# Patient Record
Sex: FEMALE
[De-identification: names, ages, dates, MRNs, and addresses within clinical notes are randomized per-mention and may not be internally consistent; named-entity substitution may affect disease eponyms.]

---

## 2018-04-09 ENCOUNTER — HOSPITAL ENCOUNTER (EMERGENCY)
Dept: HOSPITAL 97 - ER | Age: 43
Discharge: HOME | End: 2018-04-09
Payer: COMMERCIAL

## 2018-04-09 DIAGNOSIS — K52.9: Primary | ICD-10-CM

## 2018-04-09 PROCEDURE — 99283 EMERGENCY DEPT VISIT LOW MDM: CPT

## 2018-04-09 PROCEDURE — 81003 URINALYSIS AUTO W/O SCOPE: CPT

## 2018-04-09 PROCEDURE — 74018 RADEX ABDOMEN 1 VIEW: CPT

## 2018-04-09 PROCEDURE — 81025 URINE PREGNANCY TEST: CPT

## 2018-04-09 NOTE — EDPHYS
Physician Documentation                                                                           

 [facility]                                                                

Name: Rowan Kumar                                                                                

Age: 42 yrs                                                                                       

Sex: Female                                                                                       

: 1975                                                                                   

MRN: [id number]                                                                                   

Arrival Date: 2018                                                                          

Time: 06:21                                                                                       

Account#: [id number]                                                                            

Bed 7                                                                                             

Private MD:                                                                                       

ED Physician Jorge A Avila                                                                     

HPI:                                                                                              

                                                                                             

08:11 This 42 yrs old  Female presents to ER via Ambulatory with complaints of        jr8 

      Abdominal Pain.                                                                             

08:11 The patient presents with abdominal pain in the upper abdomen. Onset: The               jr8 

      symptoms/episode began/occurred acutely, 2 day(s) ago. The symptoms do not radiate.         

      Associated signs and symptoms: Pertinent positives: nausea, vomiting, and diarrhea. The     

      symptoms are described as crampy. Modifying factors: The symptoms are alleviated by         

      nothing, the symptoms are aggravated by food. Severity of pain: At its worst the pain       

      was mild in the emergency department the pain is unchanged. It is unknown whether or        

      not the patient has had similar symptoms in the past. The patient has not recently seen     

      a physician. Patient stated that she had n/v/d over the past two days with mild             

      abdominal cramping. N/V/D has subsided. Had eaten a good bit yesterday. Started to have     

      abdominal cramping again and wanted to be evaluated .                                       

                                                                                                  

OB/GYN:                                                                                           

06:35 LMP 3/20/2018                                                                           bp  

                                                                                                  

Historical:                                                                                       

- Allergies:                                                                                      

06:34 No Known Allergies;                                                                     bp  

- Home Meds:                                                                                      

06:34 None [Active];                                                                          bp  

- PMHx:                                                                                           

06:34 None;                                                                                   bp  

- PSHx:                                                                                           

06:34 Cholecystectomy;                                                                        bp  

                                                                                                  

- Immunization history:: Adult Immunizations up to date.                                          

- Social history:: Smoking status: Patient/guardian denies using tobacco.                         

                                                                                                  

                                                                                                  

ROS:                                                                                              

08:11 Eyes: Negative for injury, pain, redness, and discharge, ENT: Negative for injury,      jr8 

      pain, and discharge, Neck: Negative for injury, pain, and swelling, Cardiovascular:         

      Negative for chest pain, palpitations, and edema, Respiratory: Negative for shortness       

      of breath, cough, wheezing, and pleuritic chest pain, Back: Negative for injury and         

      pain, MS/Extremity: Negative for injury and deformity, Skin: Negative for injury, rash,     

      and discoloration, Neuro: Negative for headache, weakness, numbness, tingling, and          

      seizure.                                                                                    

08:11 Abdomen/GI: Positive for nausea, vomiting, and diarrhea, abdominal cramps, Negative for     

      abdominal distension, anorexia, dysphagia, hematemesis, black/tarry stool, rectal pain,     

      rectal bleeding, bowel incontinence, flatulence.                                            

                                                                                                  

Exam:                                                                                             

08:11 Eyes:  Pupils equal round and reactive to light, extra-ocular motions intact.  Lids and jr8 

      lashes normal.  Conjunctiva and sclera are non-icteric and not injected.  Cornea within     

      normal limits.  Periorbital areas with no swelling, redness, or edema. ENT:  Nares          

      patent. No nasal discharge, no septal abnormalities noted.  Tympanic membranes are          

      normal and external auditory canals are clear.  Oropharynx with no redness, swelling,       

      or masses, exudates, or evidence of obstruction, uvula midline.  Mucous membranes           

      moist. Neck:  Trachea midline, no thyromegaly or masses palpated, and no cervical           

      lymphadenopathy.  Supple, full range of motion without nuchal rigidity, or vertebral        

      point tenderness.  No Meningismus. Cardiovascular:  Regular rate and rhythm with a          

      normal S1 and S2.  No gallops, murmurs, or rubs.  Normal PMI, no JVD.  No pulse             

      deficits. Respiratory:  Lungs have equal breath sounds bilaterally, clear to                

      auscultation and percussion.  No rales, rhonchi or wheezes noted.  No increased work of     

      breathing, no retractions or nasal flaring. Back:  No spinal tenderness.  No                

      costovertebral tenderness.  Full range of motion. Skin:  Warm, dry with normal turgor.      

      Normal color with no rashes, no lesions, and no evidence of cellulitis. MS/ Extremity:      

      Pulses equal, no cyanosis.  Neurovascular intact.  Full, normal range of motion. Neuro:     

       Awake and alert, GCS 15, oriented to person, place, time, and situation.  Cranial          

      nerves II-XII grossly intact.  Motor strength 5/5 in all extremities.  Sensory grossly      

      intact.  Cerebellar exam normal.  Normal gait.                                              

08:11 Abdomen/GI: Inspection: abdomen appears normal, Bowel sounds: active, all quadrants,        

      Palpation: abdomen is soft and non-tender, in all quadrants, Indicators: McBurney's         

      point is not tender, Noble's sign is negative, Rovsing's sign is negative, Liver: no       

      appreciated palpable abnormalities, tenderness, is not appreciated.                         

                                                                                                  

Vital Signs:                                                                                      

06:35  / 74; Pulse 100; Resp 18; Temp 98.2; Pulse Ox 100% ; Weight 90.72 kg; Height 5   bp  

      ft. 1 in. (154.94 cm); Pain 8/10;                                                           

07:03  / 83; Pulse 95; Resp 18; Pulse Ox 100% on R/A;                                   hj  

08:41  / 78; Pulse 90; Resp 18; Pulse Ox 100% on R/A;                                   hj  

06:35 Body Mass Index 37.79 (90.72 kg, 154.94 cm)                                             bp  

                                                                                                  

MDM:                                                                                              

06:58 Patient medically screened.                                                             jr8 

08:26 Data reviewed: vital signs, nurses notes, lab test result(s), radiologic studies, plain jr8 

      films, and as a result, I will discharge patient. Data interpreted: Pulse oximetry: on      

      room air is 100 %. Interpretation: normal. Counseling: I had a detailed discussion with     

      the patient and/or guardian regarding: the historical points, exam findings, and any        

      diagnostic results supporting the discharge/admit diagnosis, lab results, radiology         

      results, the need for outpatient follow up, a family practitioner, to return to the         

      emergency department if symptoms worsen or persist or if there are any questions or         

      concerns that arise at home. Response to treatment: the patient's symptoms have             

      markedly improved after treatment. Special discussion: Based on the patient's Hx, exam,     

      and Dx evaluation, there is no indication for emergent surgery or inpatient Tx. It is       

      understood by the patient/guardian that if the Sx's persist or worsen they need to          

      return immediately for re-evaluation.                                                       

                                                                                                  

                                                                                             

06:40 Order name: Urine Dipstick--Ancillary (enter results); Complete Time: 07:58             em1 

                                                                                             

06:40 Order name: Urine Pregnancy--Ancillary (enter results); Complete Time: 07:58            em1 

                                                                                             

07:45 Order name: SARAH PIERRE                                                                    jr8 

                                                                                                  

Administered Medications:                                                                         

07:11 Drug: Bentyl 20 mg Route: PO;                                                           hj  

07:15 Follow up: Response: No adverse reaction; Pain is decreased                             hj  

07:11 Drug: Zofran 4 mg Route: PO;                                                            hj  

07:16 Follow up: Response: No adverse reaction; Nausea is decreased                           hj  

                                                                                                  

                                                                                                  

Disposition:                                                                                      

04/10                                                                                             

00:20 Co-signature as Attending Physician, Jorge A Avila MD I agree with the assessment and 4 

      plan of care.                                                                               

                                                                                                  

Disposition:                                                                                      

18 08:27 Discharged to Home. Impression: Abdominal and pelvic pain, Gastroenteritis.        

- Condition is Stable.                                                                            

- Discharge Instructions: Abdominal Pain, Adult, Viral Gastroenteritis.                           

- Prescriptions for Bentyl 20 mg Oral Tablet - take 1 tablet by ORAL route every 6                

  hours As needed; 20 tablet. ondansetron 4 mg Oral tablet,disintegrating - take 1                

  tablet by ORAL route every 8 hours; 20 tablet.                                                  

- Medication Reconciliation Form, Thank You Letter, Antibiotic Education, Prescription            

  Opioid Use form.                                                                                

- Follow up: Private Physician; When: 5 - 6 days; Reason: Recheck today's complaints,             

  Continuance of care, Re-evaluation by your physician.                                           

- Problem is new.                                                                                 

- Symptoms have improved.                                                                         

                                                                                                  

                                                                                                  

                                                                                                  

Signatures:                                                                                       

Dispatcher [employer]                           EDMS                                                 

Clay Zhang PA PA   jr8                                                  

Shant Anderson RN RN hj Peltier, Brian, RN                      RN   Jorge A Robert MD MD   [id number]                                                  

                                                                                                  

**************************************************************************************************

## 2018-04-09 NOTE — ER
Nurse's Notes                                                                                     

 [facility]                                                                

Name: Rowan Kumar                                                                                

Age: 42 yrs                                                                                       

Sex: Female                                                                                       

: 1975                                                                                   

MRN: [id number]                                                                                   

Arrival Date: 2018                                                                          

Time: 06:21                                                                                       

Account#: [id number]                                                                            

Bed 7                                                                                             

Private MD:                                                                                       

Diagnosis: Abdominal and pelvic pain;Gastroenteritis                                              

                                                                                                  

Presentation:                                                                                     

                                                                                             

06:33 Presenting complaint: Patient states: I THOUGHT I HAD A STOMACH BUG BUT LAST NIGHT IT   bp  

      STARTED HURTING TOO BAD. Transition of care: patient was not received from another          

      setting of care. Onset of symptoms was 2018 at 21:00. Care prior to arrival:      

      None.                                                                                       

06:33 Method Of Arrival: Ambulatory                                                           bp  

06:33 Acuity: NILAY 3                                                                           bp  

                                                                                                  

Triage Assessment:                                                                                

06:34 General: Appears in no apparent distress. comfortable, obese, Behavior is calm,         bp  

      cooperative, appropriate for age. Pain: Complains of pain in right upper quadrant and       

      left upper quadrant Pain currently is 8 out of 10 on a pain scale. EENT: No deficits        

      noted. Neuro: Level of Consciousness is awake, alert, obeys commands, Oriented to           

      Appropriate for age. Cardiovascular: No deficits noted. Respiratory: Airway is patent       

      Respiratory effort is even, unlabored, Respiratory pattern is regular, symmetrical. GI:     

      Abdomen is obese, Abd is soft X 4 quads. Derm: No deficits noted. Musculoskeletal:          

      Capillary refill.                                                                           

                                                                                                  

OB/GYN:                                                                                           

06:35 LMP 3/20/2018                                                                           bp  

                                                                                                  

Historical:                                                                                       

- Allergies:                                                                                      

06:34 No Known Allergies;                                                                     bp  

- Home Meds:                                                                                      

06:34 None [Active];                                                                          bp  

- PMHx:                                                                                           

06:34 None;                                                                                   bp  

- PSHx:                                                                                           

06:34 Cholecystectomy;                                                                        bp  

                                                                                                  

- Immunization history:: Adult Immunizations up to date.                                          

- Social history:: Smoking status: Patient/guardian denies using tobacco.                         

                                                                                                  

                                                                                                  

Screenin:01 Abuse screen: Denies threats or abuse. Denies injuries from another. Nutritional        hj  

      screening: No deficits noted. Tuberculosis screening: No symptoms or risk factors           

      identified. Fall Risk None identified.                                                      

                                                                                                  

Assessment:                                                                                       

07:01 GI: Bowel sounds present X 4 quads.                                                     hj  

07:02 General: Appears in no apparent distress. uncomfortable, Behavior is calm, cooperative, hj  

      appropriate for age. Pain: Complains of pain in right upper quadrant and left upper         

      quadrant. Neuro: Level of Consciousness is awake, alert, obeys commands, Oriented to        

      person, place, time, situation, Appropriate for age. Cardiovascular: Capillary refill <     

      3 seconds Patient's skin is warm and dry. Respiratory: Airway is patent Respiratory         

      effort is even, unlabored, Respiratory pattern is regular, symmetrical. GI: Reports         

      upper abdominal pain, nausea. GI: Abdomen is non-distended, Abd is soft Abd is non          

      tender. : No signs and/or symptoms were reported regarding the genitourinary system.      

      EENT: No signs and/or symptoms were reported regarding the EENT system. Derm: No signs      

      and/or symptoms reported regarding the dermatologic system. Musculoskeletal: No signs       

      and/or symptoms reported regarding the musculoskeletal system.                              

08:42 Reassessment: Patient and/or family updated on plan of care and expected duration. Pain hj  

      level reassessed. Patient is alert, oriented x 3, equal unlabored respirations, skin        

      warm/dry/pink.                                                                              

                                                                                                  

Vital Signs:                                                                                      

06:35  / 74; Pulse 100; Resp 18; Temp 98.2; Pulse Ox 100% ; Weight 90.72 kg; Height 5   bp  

      ft. 1 in. (154.94 cm); Pain 8/10;                                                           

07:03  / 83; Pulse 95; Resp 18; Pulse Ox 100% on R/A;                                   hj  

08:41  / 78; Pulse 90; Resp 18; Pulse Ox 100% on R/A;                                   hj  

06:35 Body Mass Index 37.79 (90.72 kg, 154.94 cm)                                             bp  

                                                                                                  

ED Course:                                                                                        

06:21 Patient arrived in ED.                                                                  ds1 

06:29 Ronaldo Iraheta, RN is Primary Nurse.                                                    bp  

06:34 Triage completed.                                                                       bp  

06:35 Arm band placed on.                                                                     bp  

06:58 Clay Zhang PA is PHCP.                                                               [id number] 

06:58 Jorge A Avila MD is Attending Physician.                                            jr8 

07:01 Patient has correct armband on for positive identification. Placed in gown. Bed in low  hj  

      position. Call light in reach. Side rails up X 1. Adult w/ patient.                         

08:01 Patient moved to radiology via wheelchair.                                              jb2 

08:04 XRAY KUB In Process Unspecified.                                                        EDMS

08:18 X-ray completed. Patient tolerated procedure well. Patient moved back from radiology.   jb2 

08:40 No provider procedures requiring assistance completed. Patient did not have IV access   hj  

      during this emergency room visit.                                                           

                                                                                                  

Administered Medications:                                                                         

07:11 Drug: Bentyl 20 mg Route: PO;                                                             

07:15 Follow up: Response: No adverse reaction; Pain is decreased                               

07:11 Drug: Zofran 4 mg Route: PO;                                                              

07:16 Follow up: Response: No adverse reaction; Nausea is decreased                             

                                                                                                  

                                                                                                  

Outcome:                                                                                          

08:27 Discharge ordered by MD. mathews 

08:41 Discharged to home ambulatory, with family.                                               

08:41 Condition: stable                                                                           

08:41 Discharge instructions given to patient, family, Instructed on discharge instructions,      

      follow up and referral plans. medication usage, Demonstrated understanding of               

      instructions, follow-up care, medications, Prescriptions given X 2.                         

08:42 Patient left the ED.                                                                      

                                                                                                  

Signatures:                                                                                       

Dispatcher [employer]                           EDMS                                                 

Apollo Wilson                              jb2                                                  

Phyllis Gutierrez Josh, PA PA   jr8                                                  

Shant Anderson, RN                      RN   Ronaldo Clifton, RN                      RN   bp                                                   

                                                                                                  

Corrections: (The following items were deleted from the chart)                                    

08:11 08:03 X-ray completed. Patient tolerated procedure well. nicko                            jb2 

08:11 08:03 Patient moved back from radiology. jb2                                            jb2 

                                                                                                  

**************************************************************************************************

## 2018-04-09 NOTE — RAD REPORT
EXAM DESCRIPTION:  RAD - Abdomen 1 View (KUB) - 4/9/2018 8:14 am

 

CLINICAL HISTORY:  Abdomen pain.

 

FINDINGS:  The bowel gas pattern is unremarkable.

 

Small calcifications in the pelvis are nonspecific but probably represent phleboliths. Surgical clips
 are present within the right upper quadrant

## 2018-06-14 ENCOUNTER — HOSPITAL ENCOUNTER (EMERGENCY)
Dept: HOSPITAL 97 - ER | Age: 43
Discharge: HOME | End: 2018-06-14
Payer: COMMERCIAL

## 2018-06-14 DIAGNOSIS — R53.1: Primary | ICD-10-CM

## 2018-06-14 DIAGNOSIS — E03.9: ICD-10-CM

## 2018-06-14 DIAGNOSIS — E28.2: ICD-10-CM

## 2018-06-14 LAB
BUN BLD-MCNC: 10 MG/DL (ref 6–20)
GLUCOSE SERPLBLD-MCNC: 96 MG/DL (ref 65–120)
HCT VFR BLD CALC: 42 % (ref 36–45)
LYMPHOCYTES # SPEC AUTO: 1.8 K/UL (ref 0.7–4.9)
MCH RBC QN AUTO: 30.2 PG (ref 27–35)
MCV RBC: 87.5 FL (ref 80–100)
PMV BLD: 10.1 FL (ref 7.6–11.3)
POTASSIUM SERPL-SCNC: 3.7 MEQ/L (ref 3.6–5)
RBC # BLD: 4.8 M/UL (ref 3.86–4.86)

## 2018-06-14 PROCEDURE — 36415 COLL VENOUS BLD VENIPUNCTURE: CPT

## 2018-06-14 PROCEDURE — 85025 COMPLETE CBC W/AUTO DIFF WBC: CPT

## 2018-06-14 PROCEDURE — 99283 EMERGENCY DEPT VISIT LOW MDM: CPT

## 2018-06-14 PROCEDURE — 81003 URINALYSIS AUTO W/O SCOPE: CPT

## 2018-06-14 PROCEDURE — 80048 BASIC METABOLIC PNL TOTAL CA: CPT

## 2018-06-14 PROCEDURE — 81025 URINE PREGNANCY TEST: CPT

## 2018-06-14 NOTE — ER
Nurse's Notes                                                                                     

 [facility]                                                                

Name: Rowan Kumar                                                                                

Age: 43 yrs                                                                                       

Sex: Female                                                                                       

: 1975                                                                                   

MRN: [id number]                                                                                   

Arrival Date: 2018                                                                          

Time: 12:56                                                                                       

Account#: [id number]                                                                            

Bed 16                                                                                            

Private MD: Shivani Plummer H                                                                    

Diagnosis: Weakness                                                                               

                                                                                                  

Presentation:                                                                                     

                                                                                             

13:36 Presenting complaint: Patient states: "I've been feeling fatigued. 4 years ago I had my aj1 

      potassium low and I felt like this" Reports fatigue and dizziness. Transition of care:      

      patient was not received from another setting of care. No acute neurological deficit is     

      noted. Onset of symptoms was Gisella 10, 2018. Risk Assessment: Do you want to hurt            

      yourself or someone else? Patient reports no desire to harm self or others. Initial         

      Sepsis Screen: Does the patient meet any 2 criteria? No. Patient's initial sepsis           

      screen is negative. Does the patient have a suspected source of infection? No.              

      Patient's initial sepsis screen is negative. Care prior to arrival: None.                   

13:36 Method Of Arrival: Ambulatory                                                           [facility] 

13:36 Acuity: NILAY 3                                                                           aj 

                                                                                                  

Triage Assessment:                                                                                

13:42 The onset of the patients symptoms was more than six hours ago. The onset of the        aj1 

      patients symptoms was Gisella 10, 2018 at 08:00. General: Appears in no apparent distress.     

      comfortable, Behavior is calm, cooperative, appropriate for age. Pain: Denies pain.         

      Neuro: Level of Consciousness is awake, alert, obeys commands, Oriented to person,          

      place, time, situation,  are equal bilaterally Moves all extremities. Full             

      function Gait is steady, Speech is normal, Facial symmetry appears normal, Reports          

      dizziness, weakness. Cardiovascular: Patient's skin is warm and dry. Respiratory:           

      Airway is patent Respiratory effort is even, unlabored, Respiratory pattern is regular,     

      symmetrical. Derm: Skin is pink, warm \T\ dry. normal. Musculoskeletal: No signs and/or     

      symptoms reported regarding the musculoskeletal system. Circulation, motion, and            

      sensation intact.                                                                           

                                                                                                  

OB/GYN:                                                                                           

13:42 LMP 2018                                                                            [facility] 

                                                                                                  

Stroke Activation: Symptom onset > 6 hours                                                        

 Physician: Stroke Attending; Name: ; Notified At: ; Arrived At:                                  

 Physician: Chief Stroke Resident; Name: ; Notified At: ; Arrived At:                             

 Physician: Stroke Resident; Name: ; Notified At: ; Arrived At:                                   

 Physician: ED Attending; Name: ; Notified At: ; Arrived At:                                      

 Physician: ED Resident; Name: ; Notified At: ; Arrived At:                                       

                                                                                                  

Historical:                                                                                       

- Allergies:                                                                                      

13:42 No Known Allergies;                                                                     aj1 

- Home Meds:                                                                                      

13:42 levothyroxine 50 mcg tab 1 tab once daily [Active];                                     aj1 

- PMHx:                                                                                           

13:42 PCOS; Hypothyroidism;                                                                   aj1 

- PSHx:                                                                                           

13:42 Cholecystectomy;                                                                        aj1 

                                                                                                  

- Immunization history:: Flu vaccine is not up to date.                                           

- Social history:: Smoking status: Patient/guardian denies using tobacco.                         

- Ebola Screening: : Patient denies travel to an Ebola-affected area in the 21 days               

  before illness onset.                                                                           

- Family history:: not pertinent.                                                                 

- Hospitalizations: : No recent hospitalization is reported.                                      

                                                                                                  

                                                                                                  

Screenin:50 Abuse screen: Denies threats or abuse. Nutritional screening: No deficits noted.        aa5 

      Tuberculosis screening: No symptoms or risk factors identified. Fall Risk None              

      identified.                                                                                 

                                                                                                  

Assessment:                                                                                       

14:50 General: Appears comfortable, Behavior is calm, cooperative. Pain: Denies pain. Neuro:  aa5 

      Level of Consciousness is awake, alert, obeys commands, Oriented to person, place,          

      time, situation,  are equal bilaterally Moves all extremities. Gait is steady,         

      Speech is normal, Facial symmetry appears normal, Pupils are PERRLA, Reports weakness       

      Pt also reports intermittent dizziness, denies dizziness currently, pt states "I take       

      meclizine for the dizziness" . Cardiovascular: Heart tones S1 S2 present Rhythm is          

      regular. Respiratory: Airway is patent Respiratory effort is even, unlabored,               

      Respiratory pattern is regular, symmetrical, Breath sounds are clear bilaterally. GI:       

      Abdomen is round non-distended, Bowel sounds present X 4 quads. Abd is soft and non         

      tender X 4 quads. : No signs and/or symptoms were reported regarding the                  

      genitourinary system. EENT: No signs and/or symptoms were reported regarding the EENT       

      system. Derm: Skin is pink, warm \T\ dry. Musculoskeletal: Range of motion: intact in all   

      extremities.                                                                                

16:10 Reassessment: Patient is alert, oriented x 3, equal unlabored respirations, skin        aa5 

      warm/dry/pink. Patient denies pain at this time.                                            

                                                                                                  

Vital Signs:                                                                                      

13:42  / 88; Pulse 76; Resp 18; Temp 98.3(O); Pulse Ox 100% on R/A; Weight 91.17 kg     aj1 

      (R); Height 5 ft. 1 in. (154.94 cm) (R); Pain 0/10;                                         

15:30  / 82; Pulse 66; Resp 16 S; Pulse Ox 100% on R/A; Pain 0/10;                      aa5 

13:42 Body Mass Index 37.98 (91.17 kg, 154.94 cm)                                             aj1 

                                                                                                  

ED Course:                                                                                        

12:56 Patient arrived in ED.                                                                  mr  

12:56 Shivani Plummer DO is Private Physician.                                               mr  

13:41 Triage completed.                                                                       aj1 

13:42 Arm band placed on Patient placed in waiting room, Patient notified of wait time.       aj1 

14:23 Evelyn Johnston, RN is Primary Nurse.                                                   aa5 

14:29 Sav Hector MD is Attending Physician.                                                rn  

14:50 Patient has correct armband on for positive identification. Placed in gown. Bed in low  aa5 

      position. Call light in reach. Side rails up X2.                                            

15:00 Urine collected: clean catch specimen, clear.                                           dh3 

15:00 Initial lab(s) drawn, by me, sent to lab. Inserted saline lock: 22 gauge in left        aa5 

      antecubital area, using aseptic technique. Blood collected.                                 

15:39 No provider procedures requiring assistance completed.                                  aa5 

16:10 IV discontinued, intact, bleeding controlled, No redness/swelling at site. Pressure     aa5 

      dressing applied.                                                                           

                                                                                                  

Administered Medications:                                                                         

No medications were administered                                                                  

                                                                                                  

                                                                                                  

Outcome:                                                                                          

15:40 Discharge ordered by MD.                                                                rn  

16:10 Discharged to home ambulatory.                                                          aa5 

16:10 Condition: stable                                                                           

16:10 Discharge instructions given to patient, Instructed on discharge instructions, follow       

      up and referral plans. Demonstrated understanding of instructions, follow-up care.          

16:15 Patient left the ED.                                                                    aa5 

                                                                                                  

Signatures:                                                                                       

Marisa Villatoro RN                     RN   aj1                                                  

Kathy Colvin                                mr                                                   

Sav Hector MD MD rn Calderon, Audri, RN                     RN   5                                                  

Annita Kim                              3                                                  

                                                                                                  

**************************************************************************************************

## 2018-06-14 NOTE — EDPHYS
Physician Documentation                                                                           

 [facility]                                                                

Name: Rowan Kumar                                                                                

Age: 43 yrs                                                                                       

Sex: Female                                                                                       

: 1975                                                                                   

MRN: [id number]                                                                                   

Arrival Date: 2018                                                                          

Time: 12:56                                                                                       

Account#: [id number]                                                                            

Bed 16                                                                                            

Private MD: Shivani Plummer H                                                                    

ED Physician Sav Hector                                                                         

HPI:                                                                                              

                                                                                             

14:47 This 43 yrs old  Female presents to ER via Ambulatory with complaints of        rn  

      Weakness.                                                                                   

14:47 The patient presents to the emergency department with weakness of the entire body,      rn  

      generalized weakness. Onset: The symptoms/episode began/occurred 4 day(s) ago.              

      Associated signs and symptoms: Pertinent positives: weakness, Pertinent negatives:          

      altered mental status, fever, seizure, syncope, blurred vision, double vision, visual       

      field changes, loss of vision. Severity of symptoms: At their worst the symptoms were       

      mild in the emergency department the symptoms are unchanged. Current symptoms:              

      generalized weakness. The patient has experienced a previous episode. REports               

      generalized weakness for a few days, no fever/vomiting/diarrhea/cough/sob/abd pain.         

      Similar episode in past and was low on potassium, improved with potassium..                 

                                                                                                  

OB/GYN:                                                                                           

13:42 LMP 2018                                                                            aj1 

                                                                                                  

Historical:                                                                                       

- Allergies:                                                                                      

13:42 No Known Allergies;                                                                     aj1 

- Home Meds:                                                                                      

13:42 levothyroxine 50 mcg tab 1 tab once daily [Active];                                     aj1 

- PMHx:                                                                                           

13:42 PCOS; Hypothyroidism;                                                                   aj1 

- PSHx:                                                                                           

13:42 Cholecystectomy;                                                                        aj1 

                                                                                                  

- Immunization history:: Flu vaccine is not up to date.                                           

- Social history:: Smoking status: Patient/guardian denies using tobacco.                         

- Ebola Screening: : Patient denies travel to an Ebola-affected area in the 21 days               

  before illness onset.                                                                           

- Family history:: not pertinent.                                                                 

- Hospitalizations: : No recent hospitalization is reported.                                      

                                                                                                  

                                                                                                  

ROS:                                                                                              

14:47 Constitutional: Negative for fever, chills, and weight loss, Eyes: Negative for injury, rn  

      pain, redness, and discharge, Neck: Negative for injury, pain, and swelling,                

      Cardiovascular: Negative for chest pain, palpitations, and edema, Respiratory: Negative     

      for shortness of breath, cough, wheezing, and pleuritic chest pain, Abdomen/GI:             

      Negative for abdominal pain, nausea, vomiting, diarrhea, and constipation, Back:            

      Negative for injury and pain, MS/Extremity: Negative for injury and deformity, Skin:        

      Negative for injury, rash, and discoloration, Neuro: Negative for headache, numbness,       

      tingling, and seizure.                                                                      

                                                                                                  

Exam:                                                                                             

14:47 Constitutional:  This is a well developed, well nourished patient who is awake, alert,  rn  

      and in no acute distress. Head/Face:  Normocephalic, atraumatic. Eyes:  Pupils equal        

      round and reactive to light, extra-ocular motions intact.  Lids and lashes normal.          

      Conjunctiva and sclera are non-icteric and not injected.  Cornea within normal limits.      

      Periorbital areas with no swelling, redness, or edema. Neck:  Trachea midline, no           

      thyromegaly or masses palpated, and no cervical lymphadenopathy.  Supple, full range of     

      motion without nuchal rigidity, or vertebral point tenderness.  No Meningismus.             

      Cardiovascular:  Regular rate and rhythm with a normal S1 and S2.  No gallops, murmurs,     

      or rubs.  Normal PMI, no JVD.  No pulse deficits. Respiratory:  Lungs have equal breath     

      sounds bilaterally, clear to auscultation and percussion.  No rales, rhonchi or wheezes     

      noted.  No increased work of breathing, no retractions or nasal flaring. Abdomen/GI:        

      Soft, non-tender, with normal bowel sounds.  No distension or tympany.  No guarding or      

      rebound.  No evidence of tenderness throughout. Skin:  Warm, dry with normal turgor.        

      Normal color with no rashes, no lesions, and no evidence of cellulitis. MS/ Extremity:      

      Pulses equal, no cyanosis.  Neurovascular intact.  Full, normal range of motion.  Equal     

      circumference. Neuro:  Awake and alert, GCS 15, oriented to person, place, time, and        

      situation.  Cranial nerves II-XII grossly intact.  Motor strength 5/5 in all                

      extremities.  Sensory grossly intact.  Cerebellar exam normal.  Normal gait.                

                                                                                                  

Vital Signs:                                                                                      

13:42  / 88; Pulse 76; Resp 18; Temp 98.3(O); Pulse Ox 100% on R/A; Weight 91.17 kg     aj1 

      (R); Height 5 ft. 1 in. (154.94 cm) (R); Pain 0/10;                                         

15:30  / 82; Pulse 66; Resp 16 S; Pulse Ox 100% on R/A; Pain 0/10;                      aa5 

13:42 Body Mass Index 37.98 (91.17 kg, 154.94 cm)                                             aj1 

                                                                                                  

MDM:                                                                                              

14:29 Patient medically screened.                                                             rn  

15:39 Data reviewed: vital signs, nurses notes, lab test result(s), and as a result, I will   rn  

      discharge patient. Counseling: I had a detailed discussion with the patient and/or          

      guardian regarding: the historical points, exam findings, and any diagnostic results        

      supporting the discharge/admit diagnosis, lab results, the need for outpatient follow       

      up, to return to the emergency department if symptoms worsen or persist or if there are     

      any questions or concerns that arise at home. Special discussion: I discussed with the      

      patient/guardian in detail that at this point there is no indication for admission to       

      the hospital. It is understood, however, that if the symptoms persist or worsen the         

      patient needs to return immediately for re-evaluation.                                      

                                                                                                  

                                                                                             

14:41 Order name: CBC with Diff; Complete Time: 15:39                                         rn  

                                                                                             

14:41 Order name: Basic Metabolic Panel; Complete Time: 15:39                                 rn  

                                                                                             

14:41 Order name: IV Start; Complete Time: 15:11                                              rn  

                                                                                             

14:41 Order name: Urine Dipstick-Ancillary (obtain specimen); Complete Time: 15:11            rn  

                                                                                             

15:08 Order name: Urine Dipstick--Ancillary (enter results)                                     

                                                                                             

15:08 Order name: Urine Pregnancy--Ancillary (enter results)                                  bd  

                                                                                             

14:41 Order name: Urine Pregnancy Test (obtain specimen); Complete Time: 15:11                rn  

                                                                                                  

Administered Medications:                                                                         

No medications were administered                                                                  

                                                                                                  

                                                                                                  

Disposition:                                                                                      

18 15:40 Discharged to Home. Impression: Weakness.                                          

- Condition is Stable.                                                                            

- Discharge Instructions: Weakness.                                                               

                                                                                                  

- Medication Reconciliation Form, Thank You Letter, Antibiotic Education, Prescription            

  Opioid Use form.                                                                                

- Follow up: Private Physician; When: As needed; Reason: Recheck today's complaints,              

  Re-evaluation by your physician.                                                                

- Problem is new.                                                                                 

- Symptoms have improved.                                                                         

                                                                                                  

                                                                                                  

                                                                                                  

Signatures:                                                                                       

Dispatcher [employer]                           Marisa Ortega RN                     RN   aj1                                                  

Sav Hector MD MD rn Calderon, Audri, RN                     RN   aa5                                                  

                                                                                                  

Corrections: (The following items were deleted from the chart)                                    

16:15 15:40 2018 15:40 Discharged to Home. Impression: Weakness. Condition is Stable.   aa5 

      Forms are Medication Reconciliation Form, Thank You Letter, Antibiotic Education,           

      Prescription Opioid Use. Follow up: Private Physician; When: As needed; Reason: Recheck     

      today's complaints, Re-evaluation by your physician. Problem is new. Symptoms have          

      improved. rn                                                                                

                                                                                                  

**************************************************************************************************

## 2019-12-13 ENCOUNTER — HOSPITAL ENCOUNTER (EMERGENCY)
Dept: HOSPITAL 97 - ER | Age: 44
Discharge: HOME | End: 2019-12-13
Payer: COMMERCIAL

## 2019-12-13 VITALS — OXYGEN SATURATION: 100 % | DIASTOLIC BLOOD PRESSURE: 86 MMHG | TEMPERATURE: 97.6 F | SYSTOLIC BLOOD PRESSURE: 149 MMHG
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
## 2019-12-13 DIAGNOSIS — L27.0: ICD-10-CM

## 2019-12-13 DIAGNOSIS — L23.89: Primary | ICD-10-CM

## 2019-12-13 DIAGNOSIS — T37.5X5A: ICD-10-CM

## 2019-12-13 PROCEDURE — 99283 EMERGENCY DEPT VISIT LOW MDM: CPT

## 2019-12-13 NOTE — EDPHYS
Physician Documentation                                                                           

 [facility]                                                                 

Name: Rowan Kumar                                                                                

Age: 44 yrs                                                                                       

Sex: Female                                                                                       

: 1975                                                                                   

MRN: [id number]                                                                                   

Arrival Date: 2019                                                                          

Time: 08:56                                                                                       

Account#: [id number]                                                                            

Bed 20                                                                                            

Private MD:                                                                                       

ED Physician Jimbo Reed                                                                      

HPI:                                                                                              

                                                                                             

09:23 This 44 yrs old  Female presents to ER via Ambulatory with complaints of        snw 

      Itching, Rash.                                                                              

09:23 The patient's rash thought to be caused by Dermatitis. The rash is located on the body  snw 

      diffusely. The rash can be described as patchy, pruritic. Onset: The symptoms/episode       

      began/occurred suddenly. Associated signs and symptoms: Pertinent positives: itching.       

      Severity of symptoms: At their worst the symptoms were moderate in the emergency            

      department the symptoms are unchanged. Treatment given at home: oral steroids. The          

      patient has not experienced similar symptoms in the past. pt was given Tamiflu for          

      prophylaxis, took two days and erupted with rash, tamiflu was stopped and po Medrol         

      dose pack begun. Pt began itching yesterday..                                               

                                                                                                  

OB/GYN:                                                                                           

15:33 LMP N/A -                                                                               iw  

                                                                                                  

Historical:                                                                                       

- Allergies:                                                                                      

09:07 No Known Allergies;                                                                     ss  

- Home Meds:                                                                                      

09:07 Medrol (Octavio) 4 mg oral DsPk [Active];                                                   ss  

- PMHx:                                                                                           

09:07 Hypothyroidism; PCOS;                                                                   ss  

- PSHx:                                                                                           

09:07 Cholecystectomy;                                                                        ss  

                                                                                                  

- Immunization history:: Adult Immunizations up to date.                                          

- Social history:: Smoking status: Patient/guardian denies using tobacco.                         

- Ebola Screening: : Patient denies exposure to infectious person Patient denies travel           

  to an Ebola-affected area in the 21 days before illness onset.                                  

                                                                                                  

                                                                                                  

ROS:                                                                                              

09:23 Constitutional: Negative for fever, chills, and weight loss, Eyes: Negative for injury, snw 

      pain, redness, and discharge, ENT: Negative for injury, pain, and discharge, Neck:          

      Negative for injury, pain, and swelling, Cardiovascular: Negative for chest pain,           

      palpitations, and edema, Respiratory: Negative for shortness of breath, cough,              

      wheezing, and pleuritic chest pain, Abdomen/GI: Negative for abdominal pain, nausea,        

      vomiting, diarrhea, and constipation, Back: Negative for injury and pain, : Negative      

      for injury, bleeding, discharge, and swelling, MS/Extremity: Negative for injury and        

      deformity, Neuro: Negative for headache, weakness, numbness, tingling, and seizure.         

09:23 Skin: Positive for rash, itching.                                                           

                                                                                                  

Exam:                                                                                             

09:22 Constitutional:  This is a well developed, well nourished patient who is awake, alert,  snw 

      and in no acute distress. Head/Face:  Normocephalic, atraumatic. Eyes:  Pupils equal        

      round and reactive to light, extra-ocular motions intact.  Lids and lashes normal.          

      Conjunctiva and sclera are non-icteric and not injected.  Cornea within normal limits.      

      Periorbital areas with no swelling, redness, or edema. ENT:  Nares patent. No nasal         

      discharge, no septal abnormalities noted.  Tympanic membranes are normal and external       

      auditory canals are clear.  Oropharynx with no redness, swelling, or masses, exudates,      

      or evidence of obstruction, uvula midline.  Mucous membranes moist. Neck:  Trachea          

      midline, no thyromegaly or masses palpated, and no cervical lymphadenopathy.  Supple,       

      full range of motion without nuchal rigidity, or vertebral point tenderness.  No            

      Meningismus. Chest/axilla:  Normal chest wall appearance and motion.  Nontender with no     

      deformity.  No lesions are appreciated. Cardiovascular:  Regular rate and rhythm with a     

      normal S1 and S2.  No gallops, murmurs, or rubs.  Normal PMI, no JVD.  No pulse             

      deficits. Respiratory:  Lungs have equal breath sounds bilaterally, clear to                

      auscultation and percussion.  No rales, rhonchi or wheezes noted.  No increased work of     

      breathing, no retractions or nasal flaring. Abdomen/GI:  Soft, non-tender, with normal      

      bowel sounds.  No distension or tympany.  No guarding or rebound.  No evidence of           

      tenderness throughout. Back:  No spinal tenderness.  No costovertebral tenderness.          

      Full range of motion. MS/ Extremity:  Pulses equal, no cyanosis.  Neurovascular intact.     

       Full, normal range of motion. Neuro:  Awake and alert, GCS 15, oriented to person,         

      place, time, and situation.  Cranial nerves II-XII grossly intact.  Motor strength 5/5      

      in all extremities.  Sensory grossly intact.  Cerebellar exam normal.  Normal gait.         

      Psych:  Awake, alert, with orientation to person, place and time.  Behavior, mood, and      

      affect are within normal limits.                                                            

09:22 Skin: Appearance: normal except for affected area, consistent with  drug rash.              

                                                                                                  

Vital Signs:                                                                                      

09:07  / 86; Pulse 84; Resp 16; Temp 97.6(TE); Pulse Ox 100% on R/A; Weight 93.44 kg;   ss  

      Height 5 ft. 1 in. (154.94 cm); Pain 0/10;                                                  

09:07 Body Mass Index 38.92 (93.44 kg, 154.94 cm)                                             ss  

                                                                                                  

MDM:                                                                                              

09:00 Patient medically screened.                                                             delfin 

                                                                                                  

Administered Medications:                                                                         

: Drug: Atarax 50 mg Route: PO;                                                           iw  

09:30 Follow up: Response: No adverse reaction                                                iw  

: Drug: predniSONE 20 mg Route: PO;                                                       iw  

:30 Follow up: Response: No adverse reaction                                                iw  

: Drug: Pepcid 20 mg Route: PO;                                                           iw  

 Follow up: Response: No adverse reaction                                                  

                                                                                                  

                                                                                                  

Disposition:                                                                                      

15:15 Co-signature as Attending Physician, Jimbo Reed MD I agree with the assessment and  [facility] 

      plan of care.                                                                               

                                                                                                  

Disposition:                                                                                      

19 09:11 Discharged to Home. Impression: Allergic contact dermatitis due to other agents    

  - Tamiflu.                                                                                      

- Condition is Stable.                                                                            

- Discharge Instructions: Contact Dermatitis, Rash.                                               

- Prescriptions for Vistaril 50 mg Oral capsule - take 1 capsule by ORAL route 3 times            

  per day; 30 capsule. Prednisone 20 mg Oral Tablet - take 2 tablet by ORAL route once            

  daily for 5 days; 10 tablet. Pepcid 20 mg Oral Tablet - take 1 tablet by ORAL route             

  once daily; 20 tablet.                                                                          

- Work release form, Medication Reconciliation Form, Thank You Letter, Antibiotic                 

  Education, Prescription Opioid Use form.                                                        

- Follow up: Private Physician; When: 2 - 3 days; Reason: Recheck today's complaints,             

  Continuance of care, Re-evaluation by your physician. Follow up: Emergency                      

  Department; When: As needed; Reason: Worsening of condition.                                    

                                                                                                  

                                                                                                  

                                                                                                  

Signatures:                                                                                       

Jimbo Reed MD MD cha Therrien, Shelly, FNP-C                 FNP-Csnw                                                  

Elena Stone RN                     RN                                                      

Priyanka Nguyen RN                      RN   ss                                                   

                                                                                                  

Corrections: (The following items were deleted from the chart)                                    

09:20 09:11 2019 09:11 Discharged to Home. Impression: Allergic contact dermatitis due  iw  

      to other agents - Tamiflu. Condition is Stable. Forms are Medication Reconciliation         

      Form, Thank You Letter, Antibiotic Education, Prescription Opioid Use. Follow up:           

      Private Physician; When: 2 - 3 days; Reason: Recheck today's complaints, Continuance of     

      care, Re-evaluation by your physician. Follow up: Emergency Department; When: As            

      needed; Reason: Worsening of condition. snw                                                 

                                                                                                  

**************************************************************************************************

## 2019-12-13 NOTE — ER
Nurse's Notes                                                                                     

 [facility]                                                                 

Name: Rowan Kumar                                                                                

Age: 44 yrs                                                                                       

Sex: Female                                                                                       

: 1975                                                                                   

MRN: [id number]                                                                                   

Arrival Date: 2019                                                                          

Time: 08:56                                                                                       

Account#: [id number]                                                                            

Bed 20                                                                                            

Private MD:                                                                                       

Diagnosis: Allergic contact dermatitis due to other agents-Tamiflu                                

                                                                                                  

Presentation:                                                                                     

                                                                                             

09:03 Presenting complaint: Patient states: Patient took Tamiflu last week for two days and   ss  

      developed a rash all over. Given a Medrol Dose pack Monday, but patient reports it is       

      not helping, but now it is itching since yesterday. Transition of care: patient was not     

      received from another setting of care. Onset: The symptoms/episode began/occurred 4         

      day(s) ago. Anaphylaxis evaluation, no signs or symptoms of anaphylaxis were noted.         

      Onset of symptoms was 2019. Risk Assessment: Do you want to hurt yourself      

      or someone else? Patient reports no desire to harm self or others. Initial Sepsis           

      Screen: Does the patient meet any 2 criteria? No. Patient's initial sepsis screen is        

      negative. Does the patient have a suspected source of infection? No. Patient's initial      

      sepsis screen is negative. Care prior to arrival: None.                                     

09:03 Method Of Arrival: Ambulatory                                                           ss  

09:03 Acuity: NILAY 5                                                                           ss  

                                                                                                  

OB/GYN:                                                                                           

15:33 LMP N/A -                                                                               iw  

                                                                                                  

Historical:                                                                                       

- Allergies:                                                                                      

09:07 No Known Allergies;                                                                     ss  

- Home Meds:                                                                                      

09:07 Medrol (Octavio) 4 mg oral DsPk [Active];                                                   ss  

- PMHx:                                                                                           

09:07 Hypothyroidism; PCOS;                                                                   ss  

- PSHx:                                                                                           

09:07 Cholecystectomy;                                                                        ss  

                                                                                                  

- Immunization history:: Adult Immunizations up to date.                                          

- Social history:: Smoking status: Patient/guardian denies using tobacco.                         

- Ebola Screening: : Patient denies exposure to infectious person Patient denies travel           

  to an Ebola-affected area in the 21 days before illness onset.                                  

                                                                                                  

                                                                                                  

Screenin:08 Abuse screen: Denies threats or abuse. Denies injuries from another. Nutritional        ss  

      screening: No deficits noted. Tuberculosis screening: Never had TB. Fall Risk None          

      identified.                                                                                 

                                                                                                  

Assessment:                                                                                       

09:08 General: Appears in no apparent distress. comfortable, Behavior is calm, cooperative,   ss  

      Denies fever, feeling ill, fatigue, chills. Pain: Denies pain. Neuro: Level of              

      Consciousness is awake, alert, obeys commands, Oriented to person, place, time,             

      situation. Cardiovascular: Capillary refill < 3 seconds is brisk in bilateral fingers.      

      Respiratory: Airway is patent Respiratory effort is even, unlabored, Respiratory            

      pattern is regular, Breath sounds are clear bilaterally. Denies cough, shortness of         

      breath labored breathing. GI: No signs and/or symptoms were reported involving the          

      gastrointestinal system. Derm: Rash noted that is macular, itchy, raised, Reports Rash      

      that began 4 days ago, call over body, after taking Tamiflu. Rash has not improved even     

      after starting Medrol Dose OCTAVIO. Itching began yesterday. Musculoskeletal: Circulation,      

      motion, and sensation intact. Range of motion: intact in all extremities.                   

                                                                                                  

Vital Signs:                                                                                      

09:07  / 86; Pulse 84; Resp 16; Temp 97.6(TE); Pulse Ox 100% on R/A; Weight 93.44 kg;   ss  

      Height 5 ft. 1 in. (154.94 cm); Pain 0/10;                                                  

09:07 Body Mass Index 38.92 (93.44 kg, 154.94 cm)                                               

                                                                                                  

ED Course:                                                                                        

08:56 Patient arrived in ED.                                                                  as  

08:56 Elena Stone, RN is Primary Nurse.                                                   iw  

09:00 Jimbo Reed MD is Attending Physician.                                             delfin 

09:05 Triage completed.                                                                       ss  

09:06 [facility]                                                        snw 

09:07 Arm band placed on right wrist.                                                         ss  

09:08 Patient has correct armband on for positive identification. Bed in low position. Call   ss  

      light in reach.                                                                             

09:19 No provider procedures requiring assistance completed. Patient did not have IV access   iw  

      during this emergency room visit.                                                           

                                                                                                  

Administered Medications:                                                                         

09:17 Drug: Atarax 50 mg Route: PO;                                                           iw  

09:30 Follow up: Response: No adverse reaction                                                iw  

09:17 Drug: predniSONE 20 mg Route: PO;                                                       iw  

09:30 Follow up: Response: No adverse reaction                                                iw  

09:17 Drug: Pepcid 20 mg Route: PO;                                                           iw  

09:30 Follow up: Response: No adverse reaction                                                iw  

                                                                                                  

                                                                                                  

Outcome:                                                                                          

09:11 Discharge ordered by MD.                                                                snw 

09:19 Discharged to home ambulatory.                                                          iw  

09:19 Condition: good                                                                             

09:19 Discharge instructions given to patient, Instructed on discharge instructions, follow       

      up and referral plans. Demonstrated understanding of instructions, follow-up care,          

      medications, Prescriptions given X 3.                                                       

09:20 Patient left the ED.                                                                    iw  

                                                                                                  

Signatures:                                                                                       

Jimbo Reed MD MD cha Therrien, Shelly, FNP-C                 FNP-Meenakshiw                                                  

Selam Jiménez Irene, RN                     RN   iw                                                   

Priyanka Nguyen RN                      RN   ss                                                   

                                                                                                  

**************************************************************************************************

## 2023-03-22 ENCOUNTER — HOSPITAL ENCOUNTER (EMERGENCY)
Dept: HOSPITAL 97 - ER | Age: 48
Discharge: HOME | End: 2023-03-22
Payer: COMMERCIAL

## 2023-03-22 VITALS — DIASTOLIC BLOOD PRESSURE: 73 MMHG | SYSTOLIC BLOOD PRESSURE: 156 MMHG

## 2023-03-22 VITALS — OXYGEN SATURATION: 100 % | TEMPERATURE: 97.4 F

## 2023-03-22 DIAGNOSIS — R42: Primary | ICD-10-CM

## 2023-03-22 DIAGNOSIS — Z88.8: ICD-10-CM

## 2023-03-22 LAB
ALBUMIN SERPL BCP-MCNC: 3.8 G/DL (ref 3.4–5)
ALP SERPL-CCNC: 58 U/L (ref 45–117)
ALT SERPL W P-5'-P-CCNC: 34 U/L (ref 13–56)
AST SERPL W P-5'-P-CCNC: 15 U/L (ref 15–37)
BUN BLD-MCNC: 9 MG/DL (ref 7–18)
GLUCOSE SERPLBLD-MCNC: 123 MG/DL (ref 74–106)
HCT VFR BLD CALC: 41.6 % (ref 36–45)
LYMPHOCYTES # SPEC AUTO: 1.5 K/UL (ref 0.7–4.9)
MCV RBC: 88.6 FL (ref 80–100)
PMV BLD: 9.2 FL (ref 7.6–11.3)
POTASSIUM SERPL-SCNC: 3.5 MEQ/L (ref 3.5–5.1)
RBC # BLD: 4.69 M/UL (ref 3.86–4.86)

## 2023-03-22 PROCEDURE — 36415 COLL VENOUS BLD VENIPUNCTURE: CPT

## 2023-03-22 PROCEDURE — 85025 COMPLETE CBC W/AUTO DIFF WBC: CPT

## 2023-03-22 PROCEDURE — 70450 CT HEAD/BRAIN W/O DYE: CPT

## 2023-03-22 PROCEDURE — 80053 COMPREHEN METABOLIC PANEL: CPT

## 2023-03-22 PROCEDURE — 81025 URINE PREGNANCY TEST: CPT

## 2023-03-22 NOTE — EDPHYS
Physician Documentation                                                                           

 [facility]                                                                 

Name: Rowan Kumar                                                                                

Age: 47 yrs                                                                                       

Sex: Female                                                                                       

: 1975                                                                                   

MRN: [id number]                                                                                   

Arrival Date: 2023                                                                          

Time: 11:26                                                                                       

Account#: [id number]                                                                            

Bed DIS4                                                                                          

Private MD: Shivani Plummer H                                                                    

ED Physician Alberto Lopez                                                                     

HPI:                                                                                              

                                                                                             

11:54 This 47 yrs old  Female presents to ER via Ambulatory with complaints of        snw 

      Dizziness.                                                                                  

11:54 The patient presents with lightheadedness, feeling off balance. Onset: The              snw 

      symptoms/episode began/occurred suddenly. Context: occurred at home, occurred while the     

      patient was at rest, just prior to the episode the patient experienced right hearing        

      decrease and watery feeling to right ear canal. Modifying factors: The symptoms are         

      alleviated by took Meclizine that usually helps but it did not relieve s/s today.           

      Associated signs and symptoms: Pertinent positives: hearing decreased in right ear,         

      sometimes pt has low potassium per report. Severity of symptoms: At their worst the         

      symptoms were moderate in the emergency department the symptoms have improved mildly.       

      Patient's baseline: Neuro: alert and fully oriented, Motor: no deficits, Ambulation:        

      walks without assistance, Speech: normal. The patient has experienced similar episodes      

      in the past, multiple times, but today's symptoms are worse. It is unknown whether or       

      not the patient has recently seen a physician.                                              

                                                                                                  

OB/GYN:                                                                                           

13:43 LMP N/A - Irregular menses                                                              ap3 

                                                                                                  

Historical:                                                                                       

- Allergies:                                                                                      

11:40 Tamiflu;                                                                                hb  

                                                                                                  

- Immunization history:: Client reports receiving the 2nd dose of the Covid vaccine.              

- Social history:: Smoking status: unknown.                                                       

                                                                                                  

                                                                                                  

ROS:                                                                                              

11:53 Eyes: Negative for injury, pain, redness, and discharge, ENT: Negative for injury,      snw 

      pain, and discharge, loss of hearing and watery feeling in right ear Neck: Negative for     

      injury, pain, and swelling, Cardiovascular: Negative for chest pain, palpitations, and      

      edema, Respiratory: Negative for shortness of breath, cough, wheezing, and pleuritic        

      chest pain, Abdomen/GI: Negative for abdominal pain, nausea, vomiting, diarrhea, and        

      constipation, Back: Negative for injury and pain, : Negative for injury, bleeding,        

      discharge, and swelling, MS/Extremity: Negative for injury and deformity, Skin:             

      Negative for injury, rash, and discoloration, Neuro: Negative for headache, weakness,       

      numbness, tingling, and seizure, positive for dizziness Psych: Negative for depression,     

      anxiety, suicide ideation, homicidal ideation, and hallucinations.                          

11:53 Constitutional: Positive for malaise, dizziness.                                            

                                                                                                  

Exam:                                                                                             

11:56 Head/Face:  Normocephalic, atraumatic. Eyes:  Pupils equal round and reactive to light, snw 

      extra-ocular motions intact.  Lids and lashes normal.  Conjunctiva and sclera are           

      non-icteric and not injected.  Cornea within normal limits.  Periorbital areas with no      

      swelling, redness, or edema.no nystagmus ENT:  Nares patent. No nasal discharge, no         

      septal abnormalities noted.  Tympanic membranes are normal on left but right canal full     

      of moist appearing cerumen/exudate and TM is not visualized.  Oropharynx with no            

      redness, swelling, or masses, exudates, or evidence of obstruction, uvula midline.          

      Mucous membranes moist. Neck:  Trachea midline, no thyromegaly or masses palpated, and      

      no cervical lymphadenopathy.  Supple, full range of motion without nuchal rigidity, or      

      vertebral point tenderness.  No Meningismus. Chest/axilla:  Normal chest wall               

      appearance and motion.  Nontender with no deformity.  No lesions are appreciated.           

      Cardiovascular:  Regular rate and rhythm with a normal S1 and S2.  No gallops, murmurs,     

      or rubs.  Normal PMI, no JVD.  No pulse deficits. Respiratory:  Lungs have equal breath     

      sounds bilaterally, clear to auscultation and percussion.  No rales, rhonchi or wheezes     

      noted.  No increased work of breathing, no retractions or nasal flaring. Abdomen/GI:        

      Soft, non-tender, with normal bowel sounds.  No distension or tympany.  No guarding or      

      rebound.  No evidence of tenderness throughout. Back:  No spinal tenderness.  No            

      costovertebral tenderness.  Full range of motion. Skin:  Warm, dry with normal turgor.      

      Normal color with no rashes, no lesions, and no evidence of cellulitis. MS/ Extremity:      

      Pulses equal, no cyanosis.  Neurovascular intact.  Full, normal range of motion. Neuro:     

       Awake and alert, GCS 15, oriented to person, place, time, and situation.  Cranial          

      nerves II-XII grossly intact.  Motor strength 5/5 in all extremities.  Sensory grossly      

      intact.  Cerebellar exam normal.  Normal gait. Psych:  Awake, alert, with orientation       

      to person, place and time.  Behavior, mood, and affect are within normal limits.            

11:56 Constitutional: The patient appears alert, awake, anxious.                                  

                                                                                                  

Vital Signs:                                                                                      

11:38  / 86; Pulse 97; Resp 16; Temp 97.4; Pulse Ox 100% on R/A; Weight 90.72 kg;       hb  

      Height 5 ft. 1 in. ; Pain 0/10;                                                             

13:33  / 69 Sitting; Pulse 100;                                                         ap3 

13:38  / 73 Standing; Pulse 103;                                                        ap3 

11:38 Body Mass Index 37.79 (90.72 kg, 154.94 cm)                                             hb  

11:38 Pain Scale: Adult                                                                       hb  

                                                                                                  

MDM:                                                                                              

11:46 Patient medically screened.                                                             snw 

11:59 Differential diagnosis: near-syncope, vertigo, electrolyte derangement.                 snw 

13:45 Data reviewed: vital signs, nurses notes, lab test result(s), radiologic studies, CT    snw 

      scan. Counseling: I had a detailed discussion with the patient and/or guardian              

      regarding: the historical points, exam findings, and any diagnostic results supporting      

      the discharge/admit diagnosis, the presence of at least one elevated blood pressure         

      reading (>120/80) during this emergency department visit, lab results, radiology            

      results, the need for outpatient follow up, for definitive care, to return to the           

      emergency department if symptoms worsen or persist or if there are any questions or         

      concerns that arise at home. Response to treatment: the patient's symptoms have mildly      

      improved after treatment, blood pressure trending down. Awaiting: IVF.                      

                                                                                                  

                                                                                             

11:52 Order name: CBC with Diff; Complete Time: 12:33                                         snw 

                                                                                             

11:52 Order name: CMP; Complete Time: 12:54                                                   snw 

                                                                                             

11:52 Order name: Pregnancy Test, Urine; Complete Time: 12:40                                 snw 

                                                                                             

11:52 Order name: CT Head Brain wo Cont; Complete Time: 12:33                                 snw 

                                                                                             

11:52 Order name: SL; Complete Time: 12:17                                                    snw 

                                                                                             

13:17 Order name: Orthostatic Blood Pressure; Complete Time: 13:41                            snw 

                                                                                                  

Administered Medications:                                                                         

13:51 Drug: NS 0.9%  ml Route: IV; Rate: bolus; Site: right forearm;                    ap3 

14:20 Follow up: IV Status: Completed infusion; IV Intake: 500ml                              ss  

                                                                                                  

                                                                                                  

Disposition:                                                                                      

17:23 Co-signature as Attending Physician, Alberto Lopez MD I reviewed the patient's care   rt  

      provided by the Advanced Practice Provider and agree with the diagnosis and treatment       

      plan.                                                                                       

                                                                                                  

Disposition Summary:                                                                              

23 13:44                                                                                    

Discharge Ordered                                                                                 

      Location: Home                                                                          snw 

      Condition: Stable                                                                       snw 

      Diagnosis                                                                                   

        - Dizziness and giddiness                                                             snw 

      Followup:                                                                               snw 

        - With: Shivani Plummer, DO                                                                

        - When: 2 - 3 days                                                                         

        - Reason: Recheck today's complaints, Continuance of care, Re-evaluation by your           

      physician                                                                                   

      Followup:                                                                               snw 

        - With: Emergency Department                                                               

        - When: As needed                                                                          

        - Reason: Worsening of condition                                                           

      Discharge Instructions:                                                                     

        - Discharge Summary Sheet                                                             snw 

        - Dizziness                                                                           snw 

        - Rehydration, Adult                                                                  snw 

      Forms:                                                                                      

        - Medication Reconciliation Form                                                      snw 

        - Thank You Letter                                                                    snw 

        - Antibiotic Education                                                                snw 

        - Prescription Opioid Use                                                             snw 

Signatures:                                                                                       

Dispatcher [employer]                           EDMS                                                 

Michelle Nichole FNP-C                   FNP-Csnw                                                  

Ana Miranda RN                     RN                                                      

Geovanna Harris RN                    RN   ap3                                                  

Alberto Lopez MD MD   rt                                                   

Priyanka Nguyen RN   ss                                                   

                                                                                                  

**************************************************************************************************

## 2023-03-22 NOTE — RAD REPORT
EXAM DESCRIPTION:  CT - Head Brain Wo Cont - 3/22/2023 12:22 pm

 

CLINICAL HISTORY:  Dizziness

 

COMPARISON:  none

 

TECHNIQUE:  Computed axial tomography of the head was obtained. IV contrast was not requested.

 

All CT scans are performed using dose optimization technique as appropriate and may include automated
 exposure control or mA/KV adjustment according to patient size.

 

FINDINGS:  An intracranial  bleed is not seen

 

The ventricles are normal in caliber

 

No significant hypodense areas within the brain visualized

 

No extra-axial fluid collection is noted.

 

Fluid within the sinuses/ mastoids is not seen

 

IMPRESSION:  No acute intracranial abnormality is seen

 

If patient's symptoms persist  MRI of the brain would be recommended

## 2024-04-05 NOTE — ER
Nurse's Notes                                                                                     

 [facility]                                                                 

Name: Rowan Kumar                                                                                

Age: 47 yrs                                                                                       

Sex: Female                                                                                       

: 1975                                                                                   

MRN: [id number]                                                                                   

Arrival Date: 2023                                                                          

Time: 11:26                                                                                       

Account#: [id number]                                                                            

Bed DIS4                                                                                          

Private MD: Shivani Plummer H                                                                    

Diagnosis: Dizziness and giddiness                                                                

                                                                                                  

Presentation:                                                                                     

                                                                                             

11:38 Chief complaint: Dizziness and decreased hearing in right ear since last night. Took    hb  

      meclizine 25 mg at 0900. Coronavirus screen: At this time, the client does not indicate     

      any symptoms associated with coronavirus-19. Ebola Screen: No symptoms or risks             

      identified at this time. Initial Sepsis Screen: Does the patient meet any 2 criteria?       

      No. Patient's initial sepsis screen is negative. Does the patient have a suspected          

      source of infection? No. Patient's initial sepsis screen is negative. Risk Assessment:      

      Do you want to hurt yourself or someone else? Patient reports no desire to harm self or     

      others. Onset of symptoms was 2023.                                               

11:38 Method Of Arrival: Ambulatory                                                             

11:38 Acuity: NILAY 3                                                                           hb  

                                                                                                  

Triage Assessment:                                                                                

13:42 General: Appears in no apparent distress. Behavior is calm, cooperative, appropriate    ap3 

      for age. Pain: Denies pain. Neuro: Level of Consciousness is awake, alert, obeys            

      commands, Oriented to person, place, time, situation, Reports weakness. Cardiovascular:     

      Patient's skin is warm and dry. Respiratory: Airway is patent Respiratory effort is         

      even, unlabored, Respiratory pattern is regular, symmetrical.                               

                                                                                                  

OB/GYN:                                                                                           

13:43 LMP N/A - Irregular menses                                                              ap3 

                                                                                                  

Historical:                                                                                       

- Allergies:                                                                                      

11:40 Tamiflu;                                                                                hb  

                                                                                                  

- Immunization history:: Client reports receiving the 2nd dose of the Covid vaccine.              

- Social history:: Smoking status: unknown.                                                       

                                                                                                  

                                                                                                  

Screenin:42 [facility] ED Fall Risk Assessment (Adult) History of falling in the last 3 months,       ap3 

      including since admission No falls in past 3 months (0 pts) Confusion or Disorientation     

      No (0 pts) Intoxicated or Sedated No (0 pts) Impaired Gait No (0 pts) Mobility Assist       

      Device Used No (0 pt) Altered Elimination No (0 pt) Score/Fall Risk Level 0 - 2 = Low       

      Risk. Abuse screen: Denies threats or abuse. Nutritional screening: No deficits noted.      

      Tuberculosis screening: No symptoms or risk factors identified.                             

                                                                                                  

Assessment:                                                                                       

13:52 General: awaiting fluid completion prior to discharge.                                  ap3 

14:17 Reassessment: Patient appears in no apparent distress at this time. Patient and/or      ss  

      family updated on plan of care and expected duration. Pain level reassessed. Patient is     

      alert, oriented x 3, equal unlabored respirations, skin warm/dry/pink.                      

                                                                                                  

Vital Signs:                                                                                      

11:38  / 86; Pulse 97; Resp 16; Temp 97.4; Pulse Ox 100% on R/A; Weight 90.72 kg;       hb  

      Height 5 ft. 1 in. ; Pain 0/10;                                                             

13:33  / 69 Sitting; Pulse 100;                                                         ap3 

13:38  / 73 Standing; Pulse 103;                                                        ap3 

11:38 Body Mass Index 37.79 (90.72 kg, 154.94 cm)                                             hb  

11:38 Pain Scale: Adult                                                                       hb  

                                                                                                  

ED Course:                                                                                        

11:26 Patient arrived in ED.                                                                  mr  

11:26 Shivani Plummer DO is Private Physician.                                               mr  

11:33 [facility]                                                          snw 

11:33 Alberto Lopez MD is Attending Physician.                                            snw 

11:40 Triage completed.                                                                       hb  

12:18 Initial lab(s) drawn, by me, sent to lab. Inserted saline lock: 20 gauge in right       em1 

      antecubital area, using aseptic technique. Blood collected.                                 

12:23 CT Head Brain wo Cont In Process Unspecified.                                           EDMS

13:42 No provider procedures requiring assistance completed.                                  ap3 

13:42 Arm band placed on left wrist.                                                          ap3 

13:43 Patient has correct armband on for positive identification. Adult w/ patient. Pulse ox  ap3 

      on. NIBP on.                                                                                

13:44 Shivani Plummer DO is Referral Physician.                                              snw 

14:17 Priyanka Nguyen, RN is Primary Nurse.                                                    ss  

14:17 IV discontinued, intact, bleeding controlled, No redness/swelling at site. Pressure     ss  

      dressing applied.                                                                           

                                                                                                  

Administered Medications:                                                                         

13:51 Drug: NS 0.9%  ml Route: IV; Rate: bolus; Site: right forearm;                    ap3 

14:20 Follow up: IV Status: Completed infusion; IV Intake: 500ml                              ss  

                                                                                                  

                                                                                                  

Medication:                                                                                       

13:43 VIS not applicable for this client.                                                     ap3 

                                                                                                  

Intake:                                                                                           

14:20 IV: 500ml; Total: 500ml.                                                                  

                                                                                                  

Outcome:                                                                                          

13:44 Discharge ordered by MD.                                                                gunnar 

14:17 Discharged to home ambulatory.                                                            

14:17 Condition: good                                                                             

14:17 Instructed on discharge instructions, follow up and referral plans. medication usage,       

      Demonstrated understanding of instructions, follow-up care.                                 

14:20 Patient left the ED.                                                                      

                                                                                                  

Signatures:                                                                                       

Dispatcher [employer]                           EDMS                                                 

Michelle Nichole, CAMILLEP-C                   FNP-Csnw                                                  

Vinicius Gaviota Jiménez, Pablo                               em1                                                  

Priyanka Nguyen, WILL                      RN                                                      

Ana Miranda, WILL                     RN                                                      

Geovanna Harris RN                    RN   ap3                                                  

                                                                                                  

**************************************************************************************************
Attending Attestation (For Attendings USE Only)...